# Patient Record
Sex: FEMALE | Race: BLACK OR AFRICAN AMERICAN | Employment: OTHER | URBAN - METROPOLITAN AREA
[De-identification: names, ages, dates, MRNs, and addresses within clinical notes are randomized per-mention and may not be internally consistent; named-entity substitution may affect disease eponyms.]

---

## 2017-08-14 PROBLEM — H25.13 NUCLEAR SCLEROTIC CATARACT OF BOTH EYES: Status: ACTIVE | Noted: 2017-06-14

## 2017-08-14 PROBLEM — G89.29 CHRONIC PAIN: Status: ACTIVE | Noted: 2017-05-19

## 2017-08-14 PROBLEM — E66.09 NON MORBID OBESITY DUE TO EXCESS CALORIES: Status: ACTIVE | Noted: 2017-05-19

## 2017-08-31 PROBLEM — N32.81 OAB (OVERACTIVE BLADDER): Status: ACTIVE | Noted: 2017-08-31

## 2017-08-31 PROBLEM — Z01.818 PRE-OP TESTING: Status: ACTIVE | Noted: 2017-08-31

## 2019-07-16 ENCOUNTER — HOSPITAL ENCOUNTER (OUTPATIENT)
Dept: NUTRITION | Age: 49
End: 2019-07-16

## 2019-07-30 ENCOUNTER — HOSPITAL ENCOUNTER (OUTPATIENT)
Dept: NUTRITION | Age: 49
Discharge: HOME | End: 2019-07-30
Payer: MEDICARE

## 2019-07-30 PROCEDURE — 97802 MEDICAL NUTRITION INDIV IN: CPT

## 2019-07-30 NOTE — PROGRESS NOTES
Nanette Pulido [facility]  [facility], [place], [address]  Phone: [phone] Fax: [phone]   Nutrition Assessment - Medical Nutrition Therapy   Outpatient Initial Evaluation         Patient Name: Zebedee Simmonds : 1970   Treatment Diagnosis: Systemic Lupus Erythematosus, unspecified (M32.9)   Referral Source: Canelo Arthur MD [facility]): 2019     Gender: female Age: 44 y.o. Ht: 66 in Wt:  198.2 lb  kg   BMI: 32   (Class II Obesity)         Past Medical History:  Current lupus, depression, hx of G6PD def, endometriosis, anxiety, head trauma, post-traumatic stress,      Pertinent Medications: Welbutrin, trokendi XR, Prilosec, premarin, topiramate, trazadone, bisoprolo fumarte     Biochemical Data:   No results found for: HBA1C, HGBE8, OYV7RDTL, [id number]  No results found for: NA, K, CL, CO2, AGAP, GLU, BUN, CREA, BUCR, GFRAA, GFRNA, CA, TBIL, TBILI, GPT, SGOT, AP, TP, ALB, GLOB, AGRAT, ALT  No results found for: CHOL, CHOLPOCT, CHOLX, CHLST, CHOLV, HDL, HDLPOC, LDL, LDLCPOC, LDLC, DLDLP, VLDLC, VLDL, TGLX, TRIGL, TRIGP, TGLPOCT, CHHD, CHHDX  No results found for: GPT, ALT, SGOT, GGT, GGTP, AP, APIT, APX, CBIL, TBIL, TBILI  No results found for: JUNITO, CREAPOC, ACREA, CREA, REFC3, REFC4  No results found for: BUN, BUNPOC, IBUN, MBUNV, BUNV  No results found for: MCACR, MCA1, MCA2, MCA3, MCAU, MCAU2, MCALPOCT     Assessment:   Pt's goal are to obtain knowledge regarding \"healthy eating for lupus. \" Pt was diagnosed with lupus in . Pt complains of weight gain - asked Dr to testThyroid levels, WNL    No changes in physical activity since Lupus diagnosis (\"debilitating fatigue\").  shops and cooks. They eat out often at restaurants. Restaurants like [employer] (chicken breast + broccoli/salad + sweet potato OR shrimp), or [employer], or [employer] (orders a pesto chicken flatbread, sometimes a smoothie). She tries to order foods that have the least amount of calories. If smoothie, she gets it with Splenda. Stays away from fried foods. Has an air-fryer at home (might put salmon, tilapia). Pt states she typically avoids mashed potatoes, french fries, mac'n'cheese. Sometimes pt states she likes [employer], orders a grilled nuggets + small moody. Pt states she also likes pizza and might order [employer] (orders a large flat crust vegetarian and will eat on it a few days). On weekends, grits, eggs, and turkey reza. Pt avoids pork. Pt states she stopped frying foods a few years ago. Food & Nutrition: Pt eats all throughout the day, states she tries to watch what she's eating. Used to do weight watchers March 2019, starting to lose weight on it (about 3 pounds), but then stopped the program. This week she used the crock pot and made enough food for left overs. Pt states she snacks a lot and sleeps a lot. B: bowl of cantaloupe + cereal   Snack: smart pop popcorn (sea salt or cheddar)  Snack: or Fruit   Lunch: skips d/t snacking all day or sleeping  Snack: handful of cashews or glass jar peaches (rinses)  Dinner: Most days order take-out. Or will prepare homemade meals: BBQ pulled chicken, + edamame    Ordering out: 3 days/week. Nutrition Diagnosis Food and nutrition-related knowledge deficit related to lack of exposure to information as evidenced by specific diagnosis of lupus and pt reports wanting to gain knowledge regarding how to eat for her condition. Physical inactivity related to medical condition that limits physical activity as evidenced by lupus diagnosis, pt reports \"debilitating fatigue,\" in addition to depression and anxiety, pt reports anxiety keeps her from even going to the grocery store, BMI 32.     Impaired ability to prepare foods/meals related to physical disability (balance) and high level of fatigue as evidenced by frequently eating restaurant take-out foods, pt reports frequently consuming high intakes of food prepared away from home, and pt reports she rarely has energy to prepare foods and chooses foods that are ready-to-eat or easy to grab/eat resulting in likely macronutrient intake not balanced. Nutrition Intervention &  Education: Educated pt on factors affecting slow metabolism and ways to ensure healthy functioning metabolism. Educated pt on anti-inflammatory diet and how to balance omega-6 and omega-3 fatty acids in diet. Educated pt on [employer] composition and reviewed food products. Educated pt on healthy fats vs. Unhealthy fats and monitoring risks of lupus- and medication-related comorbidities. Encouraged pt to avoid specific immune-enhancing foods which may be contraindicated in lupus and G6PD patients (alfalfa, naveen beans, garlic, echinacea. Provided suggestions for ways to eat a healthy diet for management of lupus-related symptoms and weight management. Gave pt instructions on how to call her insurance company to see if follow-up visits are covered. Handouts Provided: []  Carbohydrates  [x]  Protein  []  Non-starchy Vegatbles  []  Food Label  []  Meal and Snack Ideas  []  Food Journals []  Diabetes  []  Cholesterol  []  Sodium  []  Gen Nutr Guidelines  []  SBGM Guidelines  [x]  Others: Anti-inflammatory   Information Reviewed with: Pt   Readiness to Change Stage: []  Pre-contemplative    [x]  Contemplative  []  Preparation               []  Action                  []  Maintenance   Potential Barriers to Learning: []  Decline in memory    []  Language barrier   [x]  Other: Pt seemed  []  Emotional                  [x]  Limited mobility        Lethargic in speech and filling out paperwork. Question if fatigue is a barrier or if certain medications cause sedative-type of effect.    []  Lack of motivation     [] Vision, hearing or cognitive impairment   Expected Compliance: Fair due to multiple medical conditions and high-presence of barriers. Nutritional Goal - To promote lifestyle changes to result in:    [x]  Weight loss  []  Improved diabetic control  []  Decreased cholesterol levels  []  Decreased blood pressure  []  Weight maintenance []  Preventing any interactions associated with food allergies  []  Adequate weight gain toward goal weight  [x]  Other: Healthy dietary intake for Lupus and anti-inflammation. Patient Goals:   1. Avoid alfalfa, naveen beans, garlic, echinacea  2. Increase fruits, veggies, whole grains, and moderate protein  3. Focus on healthy fats (fish, nuts, flax seeds, etc.) and limit unhealthy fats (saturated fats, processed foods, vegetable oils, and high intake of omega-6's). Focus on Omega-3 fatty acids (specifically EPA + DHA)  4. Restriction of calories, protein and unhealthy fats. Dietitian Signature: Joseph Reed Date: 7/30/2019   Follow-up:  Will call if needed  Time: 11:03 AM

## 2019-11-26 PROBLEM — N80.9 ENDOMETRIOSIS: Status: ACTIVE | Noted: 2019-11-26

## 2019-11-26 PROBLEM — N95.1 VAGINAL DRYNESS, MENOPAUSAL: Status: ACTIVE | Noted: 2018-08-27

## 2019-11-26 PROBLEM — N95.1 HOT FLASHES DUE TO MENOPAUSE: Status: ACTIVE | Noted: 2018-08-27
